# Patient Record
Sex: FEMALE | Race: OTHER | NOT HISPANIC OR LATINO
[De-identification: names, ages, dates, MRNs, and addresses within clinical notes are randomized per-mention and may not be internally consistent; named-entity substitution may affect disease eponyms.]

---

## 2023-05-01 ENCOUNTER — TRANSCRIPTION ENCOUNTER (OUTPATIENT)
Age: 28
End: 2023-05-01

## 2023-05-01 NOTE — ASU PATIENT PROFILE, ADULT - NS PREOP UNDERSTANDS INFO
spoke to patient to be  NPO/No solid foods, dairy products after 03 Am tonight . allow to drink water  till  5-6 am , dress comfortable.  leave all valuable at home, Bring ID photo and insurance cards.  Escort arranged, address and telephone given to patient,/yes

## 2023-05-02 ENCOUNTER — OUTPATIENT (OUTPATIENT)
Dept: OUTPATIENT SERVICES | Facility: HOSPITAL | Age: 28
LOS: 1 days | Discharge: HOME | End: 2023-05-02
Payer: COMMERCIAL

## 2023-05-02 ENCOUNTER — TRANSCRIPTION ENCOUNTER (OUTPATIENT)
Age: 28
End: 2023-05-02

## 2023-05-02 VITALS
OXYGEN SATURATION: 99 % | SYSTOLIC BLOOD PRESSURE: 120 MMHG | DIASTOLIC BLOOD PRESSURE: 65 MMHG | RESPIRATION RATE: 18 BRPM | HEART RATE: 75 BPM

## 2023-05-02 VITALS
SYSTOLIC BLOOD PRESSURE: 119 MMHG | TEMPERATURE: 98 F | RESPIRATION RATE: 16 BRPM | OXYGEN SATURATION: 100 % | HEIGHT: 67 IN | DIASTOLIC BLOOD PRESSURE: 67 MMHG | HEART RATE: 75 BPM | WEIGHT: 134.04 LBS

## 2023-05-02 PROCEDURE — 88305 TISSUE EXAM BY PATHOLOGIST: CPT | Mod: 26

## 2023-05-02 DEVICE — IUD LILETTA LEVONORGESTREL 52MG: Type: IMPLANTABLE DEVICE | Status: FUNCTIONAL

## 2023-05-02 RX ORDER — ACETAMINOPHEN 500 MG
1000 TABLET ORAL ONCE
Refills: 0 | Status: COMPLETED | OUTPATIENT
Start: 2023-05-02 | End: 2023-05-02

## 2023-05-02 RX ORDER — KETOROLAC TROMETHAMINE 30 MG/ML
30 SYRINGE (ML) INJECTION ONCE
Refills: 0 | Status: DISCONTINUED | OUTPATIENT
Start: 2023-05-02 | End: 2023-05-02

## 2023-05-02 RX ORDER — LEVONORGESTREL 1.5 MG/1
52 TABLET ORAL ONCE
Refills: 0 | Status: DISCONTINUED | OUTPATIENT
Start: 2023-05-02 | End: 2023-05-02

## 2023-05-02 RX ORDER — FENTANYL CITRATE 50 UG/ML
25 INJECTION INTRAVENOUS
Refills: 0 | Status: DISCONTINUED | OUTPATIENT
Start: 2023-05-02 | End: 2023-05-02

## 2023-05-02 RX ADMIN — Medication 400 MILLIGRAM(S): at 15:50

## 2023-05-02 RX ADMIN — Medication 30 MILLIGRAM(S): at 17:10

## 2023-05-02 RX ADMIN — FENTANYL CITRATE 25 MICROGRAM(S): 50 INJECTION INTRAVENOUS at 18:08

## 2023-05-02 NOTE — ASU DISCHARGE PLAN (ADULT/PEDIATRIC) - CALL YOUR DOCTOR IF YOU HAVE ANY OF THE FOLLOWING:
Bleeding that does not stop/Swelling that gets worse/Wound/Surgical Site with redness, or foul smelling discharge or pus/Nausea and vomiting that does not stop

## 2023-05-02 NOTE — BRIEF OPERATIVE NOTE - NSICDXBRIEFPROCEDURE_GEN_ALL_CORE_FT
PROCEDURES:  Insertion, IUD 02-May-2023 17:00:08  Jadon Wilson  Removal IUD 02-May-2023 17:00:31  Jadon Wilson  LEEP, cervix 02-May-2023 17:01:05  Jadon Wilson

## 2023-05-02 NOTE — PRE-ANESTHESIA EVALUATION ADULT - NSANTHOSAYNRD_GEN_A_CORE
No. EVELINA screening performed.  STOP BANG Legend: 0-2 = LOW Risk; 3-4 = INTERMEDIATE Risk; 5-8 = HIGH Risk

## 2023-05-12 LAB — SURGICAL PATHOLOGY STUDY: SIGNIFICANT CHANGE UP

## 2024-03-19 PROBLEM — Z78.9 OTHER SPECIFIED HEALTH STATUS: Chronic | Status: ACTIVE | Noted: 2023-05-01

## 2024-03-20 ENCOUNTER — APPOINTMENT (OUTPATIENT)
Dept: ORTHOPEDIC SURGERY | Facility: CLINIC | Age: 29
End: 2024-03-20
Payer: COMMERCIAL

## 2024-03-20 VITALS — BODY MASS INDEX: 20.4 KG/M2 | WEIGHT: 130 LBS | HEIGHT: 67 IN

## 2024-03-20 DIAGNOSIS — S83.422A SPRAIN OF LATERAL COLLATERAL LIGAMENT OF LEFT KNEE, INITIAL ENCOUNTER: ICD-10-CM

## 2024-03-20 DIAGNOSIS — M23.90 UNSPECIFIED INTERNAL DERANGEMENT OF UNSPECIFIED KNEE: ICD-10-CM

## 2024-03-20 DIAGNOSIS — S83.422S: ICD-10-CM

## 2024-03-20 PROBLEM — Z00.00 ENCOUNTER FOR PREVENTIVE HEALTH EXAMINATION: Status: ACTIVE | Noted: 2024-03-20

## 2024-03-20 PROCEDURE — 73562 X-RAY EXAM OF KNEE 3: CPT | Mod: LT

## 2024-03-20 RX ORDER — SPIRONOLACTONE 50 MG/1
TABLET ORAL
Refills: 0 | Status: ACTIVE | COMMUNITY

## 2024-03-20 RX ORDER — DICLOFENAC SODIUM 75 MG/1
75 TABLET, DELAYED RELEASE ORAL TWICE DAILY
Qty: 60 | Refills: 4 | Status: ACTIVE | COMMUNITY
Start: 2024-03-20 | End: 1900-01-01

## 2024-03-20 NOTE — PHYSICAL EXAM
[de-identified] : PHYSICAL EXAM LEFT KNEE  TENDER LATERAL JOINT LINE   AROM EXTENSION = 0 FLEXION = 140  SPECIAL TESTS  PATELLAR GRIND = NEG DRAWER  = NEG LACHMAN = NEG MACMURRAY = PAINFUL NO VARUS VALGUS INSTABILITY  -  PAIN WITH VALGUS   MOTOR = GROSSLY INTACT SENSORY = GROSSLY INTACT    [de-identified] : XRAY LEFT KNEE: 4 views of the knee 16 DEGREES LATERAL PF TILT  No obvious fracture or dislocation.  Alignment within normal limits

## 2024-03-20 NOTE — HISTORY OF PRESENT ILLNESS
[de-identified] : LOCATION:LEFT KNEE PAIN  DURATION: MARCH 10. 2024  - 10 DAYS AGO-TWISTED KNEE AND FELL SKIING QUALITY: ACHY   INTERMITTENT PAIN LEVEL:4-8 /10  LATERALKNEE  TREATMENTS: REST,   AGGRAVATING FACTORS: BENDING, WALKING DOWN HILL, STEPS, SUDDEN TURN   ASSOCIATED POPPING/CLICKING OCCASIONAL

## 2024-03-20 NOTE — DISCUSSION/SUMMARY
[de-identified] : LEFT LATERAL KNEE SPRAIN   XRAYS REVIEWED = WNL, + TILT  DICLOFENAC  PRESCRIBED 2-3 WEEKS  COLD  PACKS PRN  MRI RULE OUT LATERAL MENISCUS TEAR

## 2024-04-22 ENCOUNTER — APPOINTMENT (OUTPATIENT)
Dept: ORTHOPEDIC SURGERY | Facility: CLINIC | Age: 29
End: 2024-04-22
Payer: COMMERCIAL

## 2024-04-22 DIAGNOSIS — S82.142S DISPLACED BICONDYLAR FRACTURE OF LEFT TIBIA, SEQUELA: ICD-10-CM

## 2024-04-22 PROCEDURE — 73562 X-RAY EXAM OF KNEE 3: CPT | Mod: LT

## 2024-04-22 PROCEDURE — 99213 OFFICE O/P EST LOW 20 MIN: CPT

## 2024-04-22 NOTE — DISCUSSION/SUMMARY
[de-identified] : COLD OR HEAT AS PREFERED TYLENOL FOR PAIN  CYCLE , ELLIPTICAL AND CAREFUL WEIGHTS MACHINES  NO JUMPING RUNNING 6 MORE WEELS  XRAYS NORMAL TODAY CONSIDER CALCIUM SUPPLEMENTS WITH VITAMIN D  RETURN ONLY FOR CONTINUING LIMITING SYMPTOMS BEYOND 12 WEEKS

## 2024-04-22 NOTE — HISTORY OF PRESENT ILLNESS
[de-identified] : LEF TKNEE PAIN  FOLLOW UP MRI RESULTS TODAY    PREVIOSU HPI LOCATION:LEFT KNEE PAIN  DURATION: MARCH 10. 2024  - 10 DAYS AGO-TWISTED KNEE AND FELL SKIING QUALITY: ACHY   INTERMITTENT PAIN LEVEL:4-8 /10  LATERALKNEE  TREATMENTS: REST,   AGGRAVATING FACTORS: BENDING, WALKING DOWN HILL, STEPS, SUDDEN TURN   ASSOCIATED POPPING/CLICKING OCCASIONAL

## 2024-04-22 NOTE — PHYSICAL EXAM
[de-identified] : XRAY LEFT KNEE: 4 views of the knee No obvious fracture or dislocation.  Alignment within normal limits     Date of Exam: 04-   EXAM:  MRI LEFT KNEE WITHOUT CONTRAST    IMPRESSION:  MRI of the left knee demonstrates:  1.  Nondisplaced transverse fracture posterolateral corner of the proximal tibia with surrounding bone contusion. No disruption of the articular cortex. 2.  Minimal joint effusion. Trace popliteal cyst.  3.  No meniscal tear.  4.  Intact cruciate and collateral ligaments. 5.  The articular cartilage is preserved.  6.  Patella areli.

## 2024-04-24 PROBLEM — S82.142S: Status: ACTIVE | Noted: 2024-04-24

## 2025-03-04 NOTE — PRE-ANESTHESIA EVALUATION ADULT - NSANTHADDINFOFT_GEN_ALL_CORE
Not limited to morbidity/mortality, pain/positioning/PONV, emergence, airway including LMA/ETT.  Anesthesia process/side effects discussed and all offered questions discussed.
show

## (undated) DEVICE — BLADE SCALPEL SAFETY #11 WITH PLASTIC GREEN HANDLE

## (undated) DEVICE — SUT SILK 2-0 18" FS

## (undated) DEVICE — POSITIONER FOAM EGG CRATE ULNAR 2PCS (PINK)

## (undated) DEVICE — TUBING SUCTION 20FT

## (undated) DEVICE — GLV 7 PROTEXIS (WHITE)

## (undated) DEVICE — ELCTR BOVIE PENCIL BLADE 10FT

## (undated) DEVICE — DRSG PAD SANITARY OB

## (undated) DEVICE — SLV COMPRESSION KNEE MED

## (undated) DEVICE — POSITIONER STRAP ARMBOARD 1.5X32" DISP

## (undated) DEVICE — PACK D&C

## (undated) DEVICE — WARMING BLANKET UPPER ADULT

## (undated) DEVICE — APPLICATOR COTTON TIP 6"

## (undated) DEVICE — SUCTION YANKAUER FLEXIBE HI CAPACITY NO VENT